# Patient Record
Sex: FEMALE | Race: OTHER | NOT HISPANIC OR LATINO | Employment: FULL TIME | URBAN - METROPOLITAN AREA
[De-identification: names, ages, dates, MRNs, and addresses within clinical notes are randomized per-mention and may not be internally consistent; named-entity substitution may affect disease eponyms.]

---

## 2018-11-30 ENCOUNTER — TRANSCRIBE ORDERS (OUTPATIENT)
Dept: ADMINISTRATIVE | Facility: HOSPITAL | Age: 48
End: 2018-11-30

## 2018-11-30 DIAGNOSIS — Z12.39 SCREENING BREAST EXAMINATION: Primary | ICD-10-CM

## 2018-12-12 ENCOUNTER — HOSPITAL ENCOUNTER (OUTPATIENT)
Dept: RADIOLOGY | Facility: HOSPITAL | Age: 48
Discharge: HOME | End: 2018-12-12
Payer: COMMERCIAL

## 2018-12-12 VITALS — BODY MASS INDEX: 23.3 KG/M2 | WEIGHT: 145 LBS | HEIGHT: 66 IN

## 2018-12-12 DIAGNOSIS — Z12.39 SCREENING BREAST EXAMINATION: ICD-10-CM

## 2018-12-12 PROCEDURE — 77063 BREAST TOMOSYNTHESIS BI: CPT

## 2018-12-12 PROCEDURE — 77067 SCR MAMMO BI INCL CAD: CPT

## 2020-01-17 ENCOUNTER — TRANSCRIBE ORDERS (OUTPATIENT)
Dept: ADMINISTRATIVE | Facility: HOSPITAL | Age: 50
End: 2020-01-17

## 2020-01-17 DIAGNOSIS — Z12.31 ENCOUNTER FOR SCREENING MAMMOGRAM FOR BREAST CANCER: Primary | ICD-10-CM

## 2020-05-05 ENCOUNTER — HOSPITAL ENCOUNTER (OUTPATIENT)
Dept: RADIOLOGY | Facility: HOSPITAL | Age: 50
Discharge: HOME | End: 2020-05-05

## 2020-06-15 ENCOUNTER — HOSPITAL ENCOUNTER (OUTPATIENT)
Dept: RADIOLOGY | Facility: HOSPITAL | Age: 50
Discharge: HOME | End: 2020-06-15
Payer: COMMERCIAL

## 2020-06-15 VITALS — BODY MASS INDEX: 23.3 KG/M2 | HEIGHT: 66 IN | WEIGHT: 145 LBS

## 2020-06-15 DIAGNOSIS — Z12.31 ENCOUNTER FOR SCREENING MAMMOGRAM FOR BREAST CANCER: ICD-10-CM

## 2020-06-15 PROCEDURE — 77063 BREAST TOMOSYNTHESIS BI: CPT

## 2020-06-15 PROCEDURE — 77067 SCR MAMMO BI INCL CAD: CPT

## 2021-02-26 ENCOUNTER — TELEPHONE (OUTPATIENT)
Dept: GASTROENTEROLOGY | Facility: CLINIC | Age: 51
End: 2021-02-26

## 2021-02-26 NOTE — TELEPHONE ENCOUNTER
Returned pts call  She called to schedule and appointment with Dr Severo Cocks  She last saw him in 2008  I lmom for her to call back and we would get her scheduled  If she calls back please schedule

## 2021-02-26 NOTE — TELEPHONE ENCOUNTER
Pt called back  She is going to call her insurance to see if we are PAR with her plan  I gave her the NPI and Tax ID #s  She will call back if we participate

## 2021-03-01 NOTE — TELEPHONE ENCOUNTER
Patient left message late Friday returning call  I returned her call and had to leave message for her to call back

## 2021-03-12 ENCOUNTER — OFFICE VISIT (OUTPATIENT)
Dept: GASTROENTEROLOGY | Facility: CLINIC | Age: 51
End: 2021-03-12
Payer: COMMERCIAL

## 2021-03-12 VITALS
WEIGHT: 163 LBS | BODY MASS INDEX: 27.16 KG/M2 | SYSTOLIC BLOOD PRESSURE: 137 MMHG | DIASTOLIC BLOOD PRESSURE: 78 MMHG | HEIGHT: 65 IN | HEART RATE: 77 BPM

## 2021-03-12 DIAGNOSIS — Z12.11 ENCOUNTER FOR COLORECTAL CANCER SCREENING: ICD-10-CM

## 2021-03-12 DIAGNOSIS — Z12.12 ENCOUNTER FOR COLORECTAL CANCER SCREENING: ICD-10-CM

## 2021-03-12 DIAGNOSIS — K64.9 HEMORRHOIDS, UNSPECIFIED HEMORRHOID TYPE: Primary | ICD-10-CM

## 2021-03-12 PROCEDURE — 99204 OFFICE O/P NEW MOD 45 MIN: CPT | Performed by: PHYSICIAN ASSISTANT

## 2021-03-12 RX ORDER — DICYCLOMINE HCL 20 MG
TABLET ORAL 4 TIMES DAILY
COMMUNITY
Start: 2009-11-07 | End: 2021-07-07

## 2021-03-12 RX ORDER — VALACYCLOVIR HYDROCHLORIDE 1 G/1
TABLET, FILM COATED ORAL
COMMUNITY
Start: 2021-02-22

## 2021-03-12 NOTE — PROGRESS NOTES
[facility] - Outpatient Consultation  Amparo Chun 45 y o  female MRN: [id number]  Encounter: [id number]          ASSESSMENT AND PLAN:      1  Hemorrhoids, unspecified hemorrhoid type   patient with history of hemorrhoids and underwent multiple bandings which were unsuccessful and apparently very painful after in office banding procedure  We will plan to do hemorrhoidal banding at the time of the colonoscopy  Recommend to start Metamucil daily  - Colonoscopy; Future  - PAT Covid Screening; Future    2  Encounter for colorectal cancer screening   plan for colonoscopy for colorectal screening as she has not had 1 in 15 years and is having rectal bleeding  - Colonoscopy; Future  - PAT Covid Screening; Future     The patient was seen and examined with Dr Radha Castorena    ______________________________________________________________________    HPI:    This is a 79-year-old female who presents for consultation to colonoscopy as well as hemorrhoids  Patient states that she had a colonoscopy by our practice 15 years ago and she has not had 1 since that time  Patient has been having hemorrhoidal banding multiple times by physician at [facility] but still states that the hemorrhoids were still present  Last time she had bleeding was about 2 weeks ago but this was not much  Hemorrhoids are not significantly painful but she feels something prolapse out and then she feels uncomfortable  She states that her bowel habits fluctuate  She sometimes has diarrhea and other times constipation  She states that she does not take Metamucil on a regular basis but she does have some at home  Denies any family history of colon cancer  REVIEW OF SYSTEMS:    CONSTITUTIONAL: Denies any fever, chills, rigors, and weight loss  HEENT: No earache or tinnitus  Denies hearing loss or visual disturbances  CARDIOVASCULAR: No chest pain or palpitations     RESPIRATORY: Denies any cough, hemoptysis, shortness of breath or dyspnea on exertion  GASTROINTESTINAL: As noted in the History of Present Illness  GENITOURINARY: No problems with urination  Denies any hematuria or dysuria  NEUROLOGIC: No dizziness or vertigo, denies headaches  MUSCULOSKELETAL: Denies any muscle or joint pain  SKIN: Denies skin rashes or itching  ENDOCRINE: Denies excessive thirst  Denies intolerance to heat or cold  PSYCHOSOCIAL: Denies depression or anxiety  Denies any recent memory loss  Historical Information   History reviewed  No pertinent past medical history  Past Surgical History:   Procedure Laterality Date    VULVA SURGERY       Social History   Social History     Substance and Sexual Activity   Alcohol Use Yes    Frequency: Monthly or less     Social History     Substance and Sexual Activity   Drug Use Never     Social History     Tobacco Use   Smoking Status Never Smoker   Smokeless Tobacco Never Used     Family History   Problem Relation Age of Onset    Hypertension Mother     Thyroid disease Father     No Known Problems Sister     Endometrial cancer Paternal Aunt        Meds/Allergies       Current Outpatient Medications:     valACYclovir (VALTREX) 1,000 mg tablet    dicyclomine (Bentyl) 20 mg tablet    No Known Allergies        Objective     Blood pressure 137/78, pulse 77, height 5' 5" (1 651 m), weight 73 9 kg (163 lb)  Body mass index is 27 12 kg/m²  PHYSICAL EXAM:      General Appearance:   Alert, cooperative, no distress   HEENT:   Normocephalic, atraumatic, anicteric      Neck:  Supple, symmetrical, trachea midline   Lungs:   Clear to auscultation bilaterally; no rales, rhonchi or wheezing; respirations unlabored    Heart[de-identified]   Regular rate and rhythm; no murmur, rub, or gallop     Abdomen:   Soft, non-tender, non-distended; normal bowel sounds; no masses, no organomegaly    Genitalia:   Deferred    Rectal:   Deferred    Extremities:  No cyanosis, clubbing or edema    Pulses:  2+ and symmetric    Skin:  No jaundice, rashes, or lesions    Lymph nodes:  No palpable cervical lymphadenopathy        Lab Results:   No visits with results within 1 Day(s) from this visit  Latest known visit with results is:   No results found for any previous visit  Radiology Results:   No results found

## 2021-07-07 ENCOUNTER — TELEPHONE (OUTPATIENT)
Dept: PREADMISSION TESTING | Facility: HOSPITAL | Age: 51
End: 2021-07-07

## 2021-07-07 RX ORDER — DIPHENOXYLATE HYDROCHLORIDE AND ATROPINE SULFATE 2.5; .025 MG/1; MG/1
1 TABLET ORAL DAILY
COMMUNITY

## 2021-07-07 RX ORDER — FERROUS SULFATE 325(65) MG
TABLET ORAL
COMMUNITY

## 2021-07-07 RX ORDER — AMOXICILLIN AND CLAVULANATE POTASSIUM 875; 125 MG/1; MG/1
TABLET, FILM COATED ORAL
COMMUNITY
Start: 2021-07-02

## 2021-07-07 NOTE — PRE-PROCEDURE INSTRUCTIONS
Pre-Surgery Instructions:   Medication Instructions    amoxicillin-clavulanate (AUGMENTIN) 875-125 mg per tablet Patient was instructed by Physician and understands   cyanocobalamin (VITAMIN B-12) 100 MCG tablet Patient was instructed by Physician and understands   ferrous sulfate 325 (65 Fe) mg tablet Patient was instructed by Physician and understands   magnesium oxide (MAG-OX) 400 mg Patient was instructed by Physician and understands   multivitamin [facility]) TABS Patient was instructed by Physician and understands   valACYclovir (VALTREX) 1,000 mg tablet Patient was instructed by Physician and understands

## 2021-07-14 ENCOUNTER — ANESTHESIA EVENT (OUTPATIENT)
Dept: GASTROENTEROLOGY | Facility: AMBULARY SURGERY CENTER | Age: 51
End: 2021-07-14

## 2021-07-14 ENCOUNTER — HOSPITAL ENCOUNTER (OUTPATIENT)
Dept: GASTROENTEROLOGY | Facility: AMBULARY SURGERY CENTER | Age: 51
Setting detail: OUTPATIENT SURGERY
Discharge: HOME | End: 2021-07-14
Admitting: PHYSICIAN ASSISTANT
Payer: COMMERCIAL

## 2021-07-14 ENCOUNTER — ANESTHESIA (OUTPATIENT)
Dept: GASTROENTEROLOGY | Facility: AMBULARY SURGERY CENTER | Age: 51
End: 2021-07-14

## 2021-07-14 VITALS
RESPIRATION RATE: 16 BRPM | OXYGEN SATURATION: 98 % | DIASTOLIC BLOOD PRESSURE: 60 MMHG | SYSTOLIC BLOOD PRESSURE: 127 MMHG | BODY MASS INDEX: 27.16 KG/M2 | HEIGHT: 65 IN | TEMPERATURE: 97.9 F | WEIGHT: 163 LBS | HEART RATE: 68 BPM

## 2021-07-14 DIAGNOSIS — Z12.11 ENCOUNTER FOR COLORECTAL CANCER SCREENING: ICD-10-CM

## 2021-07-14 DIAGNOSIS — K64.9 HEMORRHOIDS, UNSPECIFIED HEMORRHOID TYPE: ICD-10-CM

## 2021-07-14 DIAGNOSIS — Z12.12 ENCOUNTER FOR COLORECTAL CANCER SCREENING: ICD-10-CM

## 2021-07-14 LAB
EXT PREGNANCY TEST URINE: NEGATIVE
EXT. CONTROL: NORMAL

## 2021-07-14 PROCEDURE — G0121 COLON CA SCRN NOT HI RSK IND: HCPCS | Performed by: INTERNAL MEDICINE

## 2021-07-14 PROCEDURE — 81025 URINE PREGNANCY TEST: CPT | Performed by: ANESTHESIOLOGY

## 2021-07-14 RX ORDER — ONDANSETRON 2 MG/ML
4 INJECTION INTRAMUSCULAR; INTRAVENOUS ONCE AS NEEDED
Status: CANCELLED | OUTPATIENT
Start: 2021-07-14

## 2021-07-14 RX ORDER — SODIUM CHLORIDE, SODIUM LACTATE, POTASSIUM CHLORIDE, CALCIUM CHLORIDE 600; 310; 30; 20 MG/100ML; MG/100ML; MG/100ML; MG/100ML
INJECTION, SOLUTION INTRAVENOUS CONTINUOUS PRN
Status: DISCONTINUED | OUTPATIENT
Start: 2021-07-14 | End: 2021-07-14

## 2021-07-14 RX ORDER — LIDOCAINE HYDROCHLORIDE 10 MG/ML
INJECTION, SOLUTION EPIDURAL; INFILTRATION; INTRACAUDAL; PERINEURAL AS NEEDED
Status: DISCONTINUED | OUTPATIENT
Start: 2021-07-14 | End: 2021-07-14

## 2021-07-14 RX ORDER — SODIUM CHLORIDE, SODIUM LACTATE, POTASSIUM CHLORIDE, CALCIUM CHLORIDE 600; 310; 30; 20 MG/100ML; MG/100ML; MG/100ML; MG/100ML
125 INJECTION, SOLUTION INTRAVENOUS CONTINUOUS
Status: DISCONTINUED | OUTPATIENT
Start: 2021-07-14 | End: 2021-07-18 | Stop reason: HOSPADM

## 2021-07-14 RX ORDER — PROPOFOL 10 MG/ML
INJECTION, EMULSION INTRAVENOUS AS NEEDED
Status: DISCONTINUED | OUTPATIENT
Start: 2021-07-14 | End: 2021-07-14

## 2021-07-14 RX ADMIN — LIDOCAINE HYDROCHLORIDE 50 MG: 10 INJECTION, SOLUTION EPIDURAL; INFILTRATION; INTRACAUDAL; PERINEURAL at 08:04

## 2021-07-14 RX ADMIN — PROPOFOL 40 MG: 10 INJECTION, EMULSION INTRAVENOUS at 08:10

## 2021-07-14 RX ADMIN — PROPOFOL 120 MG: 10 INJECTION, EMULSION INTRAVENOUS at 08:04

## 2021-07-14 RX ADMIN — PROPOFOL 40 MG: 10 INJECTION, EMULSION INTRAVENOUS at 08:08

## 2021-07-14 RX ADMIN — PROPOFOL 40 MG: 10 INJECTION, EMULSION INTRAVENOUS at 08:12

## 2021-07-14 RX ADMIN — SODIUM CHLORIDE, SODIUM LACTATE, POTASSIUM CHLORIDE, AND CALCIUM CHLORIDE: .6; .31; .03; .02 INJECTION, SOLUTION INTRAVENOUS at 07:59

## 2021-07-14 RX ADMIN — SODIUM CHLORIDE, SODIUM LACTATE, POTASSIUM CHLORIDE, AND CALCIUM CHLORIDE 125 ML/HR: .6; .31; .03; .02 INJECTION, SOLUTION INTRAVENOUS at 07:58

## 2021-07-14 RX ADMIN — PROPOFOL 40 MG: 10 INJECTION, EMULSION INTRAVENOUS at 08:06

## 2021-07-14 NOTE — PERIOPERATIVE NURSING NOTE
Post-Op processes and procedures were explained and all related patient and family questions were answered  Discharge instructions were given and all related patient and family questions were answered  Pt d/c to home at this time ambulatory accompanied by spouse  Pt left with all belongings  Iv was D/C intact with dry sterile dressing  Encouraged to keep follow up appointments, Verbalized understanding  D/C instructions reviewed and explained  Verbalized understanding

## 2021-07-14 NOTE — H&P
History and Physical - [facility]  Rusty Anderson 45 y o  female MRN: [id number]                  HPI: Rusty Anderson is a 45y o  year old female who presents for change in bowel habit, for screening colonoscopy, last colonoscopy was 15 years ago      REVIEW OF SYSTEMS: Per the HPI, and otherwise unremarkable  Historical Information   Past Medical History:   Diagnosis Date    Anemia      Past Surgical History:   Procedure Laterality Date    VULVA SURGERY       Social History   Social History     Substance and Sexual Activity   Alcohol Use Yes    Comment: socially     Social History     Substance and Sexual Activity   Drug Use Never     Social History     Tobacco Use   Smoking Status Never Smoker   Smokeless Tobacco Never Used     Family History   Problem Relation Age of Onset    Hypertension Mother     Thyroid disease Father     No Known Problems Sister     Endometrial cancer Paternal Aunt        Meds/Allergies     (Not in a hospital admission)      No Known Allergies    Objective     /63   Pulse 71   Temp 97 9 °F (36 6 °C) (Tympanic)   Resp 18   Ht 5' 5" (1 651 m)   Wt 73 9 kg (163 lb)   LMP 07/05/2021 (Exact Date)   SpO2 99%   BMI 27 12 kg/m²       PHYSICAL EXAM    Gen: NAD  CV: RRR  CHEST: Clear  ABD: soft, NT/ND  EXT: no edema      ASSESSMENT/PLAN:  This is a 45y o  year old female here for screening colonoscopy, and she is stable and optimized for her procedure

## 2021-07-14 NOTE — ANESTHESIA POSTPROCEDURE EVALUATION
Post-Op Assessment Note    CV Status:  Stable  Pain Score: 0    Pain management: adequate     Mental Status:  Sleepy   Hydration Status:  Euvolemic   PONV Controlled:  Controlled   Airway Patency:  Patent      Post Op Vitals Reviewed: Yes      Staff: CRNA         No complications documented      BP   104/58   Temp      Pulse 72   Resp   14   SpO2   98

## 2021-07-14 NOTE — ANESTHESIA PREPROCEDURE EVALUATION
Procedure:  COLONOSCOPY    Relevant Problems   ANESTHESIA (within normal limits)      CARDIO (within normal limits)      ENDO (within normal limits)      PULMONARY (within normal limits)        Physical Exam    Airway    Mallampati score: II  TM Distance: >3 FB  Neck ROM: full     Dental   No notable dental hx     Cardiovascular  Rhythm: regular, Rate: normal,     Pulmonary  Breath sounds clear to auscultation,     Other Findings        Anesthesia Plan  ASA Score- 1     Anesthesia Type- IV sedation with anesthesia with ASA Monitors  Additional Monitors:   Airway Plan:           Plan Factors-Exercise tolerance (METS): >4 METS  Chart reviewed  Existing labs reviewed  Patient summary reviewed  Patient is not a current smoker  Induction-     Postoperative Plan-     Informed Consent- Anesthetic plan and risks discussed with patient  I personally reviewed this patient with the CRNA  Discussed and agreed on the Anesthesia Plan with the CRNA  Alvaro Souza

## 2021-10-20 ENCOUNTER — TELEPHONE (OUTPATIENT)
Dept: GASTROENTEROLOGY | Facility: CLINIC | Age: 51
End: 2021-10-20

## 2022-02-10 ENCOUNTER — OFFICE VISIT (OUTPATIENT)
Dept: GASTROENTEROLOGY | Facility: CLINIC | Age: 52
End: 2022-02-10
Payer: COMMERCIAL

## 2022-02-10 VITALS
DIASTOLIC BLOOD PRESSURE: 93 MMHG | SYSTOLIC BLOOD PRESSURE: 127 MMHG | WEIGHT: 164 LBS | HEIGHT: 66 IN | HEART RATE: 79 BPM | BODY MASS INDEX: 26.36 KG/M2

## 2022-02-10 DIAGNOSIS — K64.2 GRADE III HEMORRHOIDS: Primary | ICD-10-CM

## 2022-02-10 DIAGNOSIS — K59.04 CHRONIC IDIOPATHIC CONSTIPATION: ICD-10-CM

## 2022-02-10 PROCEDURE — 99214 OFFICE O/P EST MOD 30 MIN: CPT | Performed by: INTERNAL MEDICINE

## 2022-02-10 RX ORDER — DIAPER,BRIEF,INFANT-TODD,DISP
EACH MISCELLANEOUS 4 TIMES DAILY PRN
Qty: 30 G | Refills: 0 | Status: SHIPPED | OUTPATIENT
Start: 2022-02-10

## 2022-02-10 RX ORDER — DOCUSATE SODIUM 100 MG/1
100 CAPSULE, LIQUID FILLED ORAL 2 TIMES DAILY
Qty: 60 CAPSULE | Refills: 5 | Status: SHIPPED | OUTPATIENT
Start: 2022-02-10

## 2022-02-10 NOTE — PATIENT INSTRUCTIONS
High Fiber Diet   AMBULATORY CARE:   A high-fiber diet  includes foods that have a high amount of fiber  Fiber is the part of fruits, vegetables, and grains that is not broken down by your body  Fiber keeps your bowel movements regular  Fiber can also help lower your cholesterol level, control blood sugar in people with diabetes, and relieve constipation  Fiber can also help you control your weight because it helps you feel full faster  Most adults should eat 25 to 35 grams of fiber each day  Talk to your dietitian or healthcare provider about the amount of fiber you need  Good sources of fiber:       · Foods with at least 4 grams of fiber per serving:      ? ? to ½ cup of high-fiber cereal (check the nutrition label on the box)    ? ½ cup of blackberries or raspberries    ? 4 dried prunes    ? 1 cooked artichoke    ? ½ cup of cooked legumes, such as lentils, or red, kidney, and seals beans    · Foods with 1 to 3 grams of fiber per serving:      ? 1 slice of whole-wheat, pumpernickel, or rye bread    ? ½ cup of cooked brown rice    ? 4 whole-wheat crackers    ? 1 cup of oatmeal    ? ½ cup of cereal with 1 to 3 grams of fiber per serving (check the nutrition label on the box)    ? 1 small piece of fruit, such as an apple, banana, pear, kiwi, or orange    ? 3 dates    ? ½ cup of canned apricots, fruit cocktail, peaches, or pears    ? ½ cup of raw or cooked vegetables, such as carrots, cauliflower, cabbage, spinach, squash, or corn    Ways that you can increase fiber in your diet:   · Choose brown or wild rice instead of white rice  · Use whole wheat flour in recipes instead of white or all-purpose flour  · Add beans and peas to casseroles or soups  · Choose fresh fruit and vegetables with peels or skins on instead of juices  Other diet guidelines to follow:   · Add fiber to your diet slowly  You may have abdominal discomfort, bloating, and gas if you add fiber to your diet too quickly       · Drink plenty of liquids as you add fiber to your diet  You may have nausea or develop constipation if you do not drink enough water  Ask how much liquid to drink each day and which liquids are best for you  © Copyright [employer] 2021 Information is for End User's use only and may not be sold, redistributed or otherwise used for commercial purposes  All illustrations and images included in CareNotes® are the copyrighted property of [employer]  or [facility] Vanessa Guthrie   The above information is an  only  It is not intended as medical advice for individual conditions or treatments  Talk to your doctor, nurse or pharmacist before following any medical regimen to see if it is safe and effective for you

## 2022-02-10 NOTE — PROGRESS NOTES
[facility] - Outpatient Follow-up Note  Corby Liu 45 y o  female MRN: [id number]  Encounter: [id number]          ASSESSMENT AND PLAN:      1  Grade III hemorrhoids  Patient has history of grade 3 hemorrhoid  She has occasionally felt hemorrhoidal prolapse  She has seen that with mirror also  She occasionally gets rectal bleeding  She does have constipation bloating  She has poor fiber intake  Long discussion regarding high-fiber diet or taking Metamucil  Will also give her Colace to take twice a day  I have advised patient to take Mardell Spoon however patient would not want to take it for now  Tried to do hemorrhoidal banding without success  Patient has large amount of rectal stool which prevented deployment of the band  Anoscopy was done and Most of her hemorrhoid is placed posteriorly  Evidence of distal vaginal surgery noted  Banding could not be done because of large amount of retained stool in the rectal vault  - hydrocortisone 1 % cream; Apply topically 4 (four) times a day as needed (Apply b i d )  Dispense: 30 g; Refill: 0    2  Chronic idiopathic constipation  See above discussion  High-fiber diet recommended  - docusate sodium (COLACE) 100 mg capsule; Take 1 capsule (100 mg total) by mouth 2 (two) times a day  Dispense: 60 capsule; Refill: 5    ______________________________________________________________________    SUBJECTIVE:  Constipation and rectal bleeding infrequently  Patient has history of vaginal lesion which was operated on with skin resection in the distal posterior vaginal area  REVIEW OF SYSTEMS IS OTHERWISE NEGATIVE        Historical Information   Past Medical History:   Diagnosis Date    Anemia      Past Surgical History:   Procedure Laterality Date    VULVA SURGERY       Social History   Social History     Substance and Sexual Activity   Alcohol Use Yes    Comment: socially     Social History     Substance and Sexual Activity   Drug Use Never     Social History     Tobacco Use   Smoking Status Never Smoker   Smokeless Tobacco Never Used     Family History   Problem Relation Age of Onset    Hypertension Mother     Thyroid disease Father     No Known Problems Sister     Endometrial cancer Paternal Aunt        Meds/Allergies       Current Outpatient Medications:     cyanocobalamin (VITAMIN B-12) 100 MCG tablet    multivitamin (THERAGRAN) TABS    amoxicillin-clavulanate (AUGMENTIN) 875-125 mg per tablet    docusate sodium (COLACE) 100 mg capsule    ferrous sulfate 325 (65 Fe) mg tablet    hydrocortisone 1 % cream    magnesium oxide (MAG-OX) 400 mg    valACYclovir (VALTREX) 1,000 mg tablet    No Known Allergies        Objective     Blood pressure 127/93, pulse 79, height 5' 6" (1 676 m), weight 74 4 kg (164 lb)  Body mass index is 26 47 kg/m²  PHYSICAL EXAM:      General Appearance:   Alert, cooperative, no distress   HEENT:   Normocephalic, atraumatic, anicteric      Neck:  Supple, symmetrical, trachea midline   Lungs:   Clear to auscultation bilaterally; no rales, rhonchi or wheezing; respirations unlabored    Heart[de-identified]   Regular rate and rhythm; no murmur, rub, or gallop  Abdomen:   Soft, non-tender, non-distended; normal bowel sounds; no masses, no organomegaly    Genitalia:   Deferred    Rectal:   Deferred    Extremities:  No cyanosis, clubbing or edema    Pulses:  2+ and symmetric    Skin:  No jaundice, rashes, or lesions    Lymph nodes:  No palpable cervical lymphadenopathy        Lab Results:   No visits with results within 1 Day(s) from this visit  Latest known visit with results is:   Hospital Outpatient Visit on 07/14/2021   Component Date Value    EXT Preg Test, Ur 07/14/2021 Negative     Control 07/14/2021 Valid          Radiology Results:   No results found

## 2022-02-28 ENCOUNTER — APPOINTMENT (EMERGENCY)
Dept: RADIOLOGY | Facility: HOSPITAL | Age: 52
End: 2022-02-28
Payer: OTHER MISCELLANEOUS

## 2022-02-28 ENCOUNTER — HOSPITAL ENCOUNTER (EMERGENCY)
Facility: HOSPITAL | Age: 52
Discharge: HOME | End: 2022-02-28
Attending: EMERGENCY MEDICINE
Payer: OTHER MISCELLANEOUS

## 2022-02-28 VITALS
HEART RATE: 98 BPM | TEMPERATURE: 97.4 F | SYSTOLIC BLOOD PRESSURE: 161 MMHG | DIASTOLIC BLOOD PRESSURE: 72 MMHG | OXYGEN SATURATION: 99 % | RESPIRATION RATE: 18 BRPM

## 2022-02-28 DIAGNOSIS — K08.89 PAIN, DENTAL: Primary | ICD-10-CM

## 2022-02-28 PROCEDURE — 99284 EMERGENCY DEPT VISIT MOD MDM: CPT | Performed by: PHYSICIAN ASSISTANT

## 2022-02-28 PROCEDURE — G1004 CDSM NDSC: HCPCS

## 2022-02-28 PROCEDURE — 70486 CT MAXILLOFACIAL W/O DYE: CPT

## 2022-02-28 PROCEDURE — 99283 EMERGENCY DEPT VISIT LOW MDM: CPT

## 2022-02-28 RX ORDER — AMOXICILLIN AND CLAVULANATE POTASSIUM 875; 125 MG/1; MG/1
1 TABLET, FILM COATED ORAL EVERY 12 HOURS SCHEDULED
Qty: 14 TABLET | Refills: 0 | Status: SHIPPED | OUTPATIENT
Start: 2022-02-28 | End: 2022-03-07

## 2022-02-28 RX ORDER — IBUPROFEN 600 MG/1
600 TABLET ORAL EVERY 6 HOURS PRN
Qty: 30 TABLET | Refills: 0 | Status: SHIPPED | OUTPATIENT
Start: 2022-02-28

## 2022-02-28 NOTE — ED PROVIDER NOTES
History  Chief Complaint   Patient presents with    Dental Pain     Dental pain to upper right side x1 week from dental implant  States she took hydrocodone at 0330 this morning for pain  Went to Dr Jeanmarie Deluna and was given antibiotic  Today started with nausea and vomitting      Patient is a 21-year-old female with no significant PMH presenting to the ER with dental pain x 4 days  She had a dental implant procedure x 1 month ago for a broken tooth that occurred x 6 months ago  The pain is located primarily on the upper R maxillary region of the mouth and radiates down her neck  She has been taking her 's hydrocodone-acetaminophen (Percocet) and ibuprofen q 4 hrs for the past 3 days  This has provided some relief, but she is having symptoms of nausea, 1 episode of vomiting, dizziness, rhinorrhea, and chills  She saw her PCP on Saturday and was given Augmentin which has been taking  She has not yet contacted her dentist regarding the pain  History provided by:  Patient   used: No    Dental Pain  Location:  Upper  Quality:  Constant  Onset quality:  Sudden  Progression:  Improving  Context: recent dental surgery    Context: not abscess    Previous work-up:  Dental exam (Implant)  Relieved by:  Acetaminophen (husbands percocet)  Worsened by:  Cold food/drink, hot food/drink and pressure  Associated symptoms: neck pain and neck swelling    Associated symptoms: no congestion, no difficulty swallowing, no drooling, no facial pain, no facial swelling, no fever, no gum swelling, no headaches and no oral lesions    Risk factors: no alcohol problem, no cancer, sufficient dental care, no periodontal disease and no smoking        Prior to Admission Medications   Prescriptions Last Dose Informant Patient Reported?  Taking?   amoxicillin-clavulanate (AUGMENTIN) 875-125 mg per tablet  Self Yes No   Patient not taking: Reported on 2/10/2022    cyanocobalamin (VITAMIN B-12) 100 MCG tablet  Self Yes No Sig: B-12 100 MCG Oral Tablet  Refills: 0   docusate sodium (COLACE) 100 mg capsule   No No   Sig: Take 1 capsule (100 mg total) by mouth 2 (two) times a day   ferrous sulfate 325 (65 Fe) mg tablet  Self Yes No   Sig: [facility] Iron 325 (65 Fe) MG Oral Tablet  Refills: 0   Patient not taking: Reported on 2/10/2022   hydrocortisone 1 % cream   No No   Sig: Apply topically 4 (four) times a day as needed (Apply b i d )   magnesium oxide (MAG-OX) 400 mg  Self Yes No   Sig: Take 400 mg by mouth 2 (two) times a day   Patient not taking: Reported on 2/10/2022    multivitamin (THERAGRAN) TABS  Self Yes No   Sig: Take 1 tablet by mouth daily   valACYclovir (VALTREX) 1,000 mg tablet  Self Yes No   Patient not taking: Reported on 2/10/2022       Facility-Administered Medications: None       Past Medical History:   Diagnosis Date    Anemia        Past Surgical History:   Procedure Laterality Date    VULVA SURGERY         Family History   Problem Relation Age of Onset    Hypertension Mother     Thyroid disease Father     No Known Problems Sister     Endometrial cancer Paternal Aunt      I have reviewed and agree with the history as documented  E-Cigarette/Vaping     E-Cigarette/Vaping Substances    Nicotine No     THC No     CBD No     Flavoring No     Other No     Unknown No      Social History     Tobacco Use    Smoking status: Never Smoker    Smokeless tobacco: Never Used   Vaping Use    Vaping Use: Not on file   Substance Use Topics    Alcohol use: Yes     Comment: socially    Drug use: Never       Review of Systems   Constitutional: Positive for chills  Negative for diaphoresis, fatigue and fever  HENT: Positive for dental problem  Negative for congestion, drooling, ear discharge, ear pain, facial swelling, hearing loss, mouth sores, nosebleeds, postnasal drip, rhinorrhea, sinus pressure, sinus pain, sneezing, sore throat, tinnitus, trouble swallowing and voice change      Eyes: Negative for pain, discharge and itching  Respiratory: Negative for cough, chest tightness and shortness of breath  Cardiovascular: Negative for chest pain and leg swelling  Gastrointestinal: Positive for nausea and vomiting  Negative for diarrhea  Genitourinary: Negative for difficulty urinating  Musculoskeletal: Positive for neck pain  Negative for gait problem and myalgias  Skin: Negative for pallor and rash  Neurological: Positive for dizziness  Negative for syncope and headaches  Physical Exam  Physical Exam  Constitutional:       General: She is not in acute distress  Appearance: Normal appearance  She is not toxic-appearing  HENT:      Head: Normocephalic and atraumatic  Right Ear: Ear canal and external ear normal       Left Ear: Tympanic membrane, ear canal and external ear normal       Ears:      Comments: Erythema present on R tympanic membrane  Nose: Nose normal       Mouth/Throat:      Mouth: Mucous membranes are moist       Pharynx: No oropharyngeal exudate  Comments: No swelling around area of dental implant  No erythema or drainage noted  Eyes:      General:         Right eye: No discharge  Left eye: No discharge  Conjunctiva/sclera: Conjunctivae normal       Pupils: Pupils are equal, round, and reactive to light  Cardiovascular:      Rate and Rhythm: Normal rate and regular rhythm  Pulses: Normal pulses  Heart sounds: Normal heart sounds  Pulmonary:      Effort: Pulmonary effort is normal       Breath sounds: Normal breath sounds  Musculoskeletal:         General: Normal range of motion  Cervical back: Normal range of motion  Lymphadenopathy:      Cervical: Cervical adenopathy present  Skin:     General: Skin is warm and dry  Capillary Refill: Capillary refill takes less than 2 seconds  Neurological:      General: No focal deficit present  Mental Status: She is alert  Mental status is at baseline        Cranial Nerves: No cranial nerve deficit  Vital Signs  ED Triage Vitals [02/28/22 0750]   Temperature Pulse Respirations Blood Pressure SpO2   (!) 97 4 °F (36 3 °C) 98 18 161/72 99 %      Temp Source Heart Rate Source Patient Position - Orthostatic VS BP Location FiO2 (%)   Temporal Monitor Sitting Left arm --      Pain Score       2           Vitals:    02/28/22 0750   BP: 161/72   Pulse: 98   Patient Position - Orthostatic VS: Sitting         Visual Acuity      ED Medications  Medications - No data to display    Diagnostic Studies  Results Reviewed     None                 CT facial bones without contrast   Final Result by Velvet Saab MD (02/28 1014)         1  Dental changes as noted  Multiple restorations present  Tooth 3 demonstrates small periapical cyst invaginating into the right maxillary sinus, possibly radicular cyst   Moderate right maxillary sinus mucoperiosteal thickening is present  Tooth 5    is absent and there is what may represent an implant or portion of an implant in the maxilla  2   Additional changes as noted  Workstation performed: [id number]                    Procedures  Procedures         ED Course                               SBIRT 20yo+      Most Recent Value   SBIRT (23 yo +)    In order to provide better care to our patients, we are screening all of our patients for alcohol and drug use  Would it be okay to ask you these screening questions?  No Filed at: 02/28/2022 0847                    MDM  Number of Diagnoses or Management Options  Pain, dental: new and requires workup  Diagnosis management comments: CT with evidence of intrusion of dental implant into sinus cavity  Reviewed results of CT scan with patient and pts dentist Dr Alexis Vazquez  Pt will be going to dentist office after discharge from ED for evaluation/intervention  Continue Augmentin  PRN ibuprofen         Amount and/or Complexity of Data Reviewed  Clinical lab tests: ordered and reviewed  Tests in the radiology section of CPT®: ordered and reviewed  Decide to obtain previous medical records or to obtain history from someone other than the patient: yes  Obtain history from someone other than the patient: yes  Review and summarize past medical records: yes  Discuss the patient with other providers: yes    Risk of Complications, Morbidity, and/or Mortality  Presenting problems: moderate  Diagnostic procedures: moderate  Management options: moderate    Patient Progress  Patient progress: stable      Disposition  Final diagnoses:   Pain, dental     Time reflects when diagnosis was documented in both MDM as applicable and the Disposition within this note     Time User Action Codes Description Comment    2/28/2022 10:30 AM Talmage Holter Add [K08 89] Pain, dental       ED Disposition     ED Disposition Condition Date/Time Comment    Discharge Stable Mon Feb 28, 2022 10:30 AM Marcia Recinos discharge to home/self care  Follow-up Information     Follow up With Specialties Details Why Contact Aditya Salas MD    [address] [place] [address] to   Go directly to office          Discharge Medication List as of 2/28/2022 10:39 AM      START taking these medications    Details   !! amoxicillin-clavulanate (AUGMENTIN) 875-125 mg per tablet Take 1 tablet by mouth every 12 (twelve) hours for 7 days, Starting Mon 2/28/2022, Until Mon 3/7/2022, Normal      ibuprofen (MOTRIN) 600 mg tablet Take 1 tablet (600 mg total) by mouth every 6 (six) hours as needed for moderate pain, Starting Mon 2/28/2022, Normal       !! - Potential duplicate medications found  Please discuss with provider        CONTINUE these medications which have NOT CHANGED    Details   !! amoxicillin-clavulanate (AUGMENTIN) 875-125 mg per tablet Starting Fri 7/2/2021, Historical Med      cyanocobalamin (VITAMIN B-12) 100 MCG tablet B-12 100 MCG Oral Tablet  Refills: 0, Historical Med      docusate sodium (COLACE) 100 mg capsule Take 1 capsule (100 mg total) by mouth 2 (two) times a day, Starting u 2/10/2022, Normal      ferrous sulfate 325 (65 Fe) mg tablet CVS Iron 325 (65 Fe) MG Oral Tablet  Refills: 0, Historical Med      hydrocortisone 1 % cream Apply topically 4 (four) times a day as needed (Apply b i d ), Starting Thu 2/10/2022, Normal      magnesium oxide (MAG-OX) 400 mg Take 400 mg by mouth 2 (two) times a day, Historical Med      multivitamin (THERAGRAN) TABS Take 1 tablet by mouth daily, Historical Med      valACYclovir (VALTREX) 1,000 mg tablet Starting Mon 2/22/2021, Historical Med       !! - Potential duplicate medications found  Please discuss with provider  No discharge procedures on file      PDMP Review     None          ED Provider  Electronically Signed by           Shilo Krause PA-C  02/28/22 1948

## 2022-03-01 ENCOUNTER — HOSPITAL ENCOUNTER (OUTPATIENT)
Dept: RADIOLOGY | Facility: HOSPITAL | Age: 52
Discharge: HOME | End: 2022-03-01
Payer: COMMERCIAL

## 2022-03-01 VITALS — WEIGHT: 145 LBS | HEIGHT: 66 IN | BODY MASS INDEX: 23.3 KG/M2

## 2022-03-01 DIAGNOSIS — Z12.31 ENCOUNTER FOR SCREENING MAMMOGRAM FOR BREAST CANCER: ICD-10-CM

## 2022-03-01 PROCEDURE — 77063 BREAST TOMOSYNTHESIS BI: CPT

## 2022-03-01 PROCEDURE — 77067 SCR MAMMO BI INCL CAD: CPT

## 2022-05-18 ENCOUNTER — HOSPITAL ENCOUNTER (OUTPATIENT)
Dept: RADIOLOGY | Facility: HOSPITAL | Age: 52
Discharge: HOME | End: 2022-05-18
Payer: COMMERCIAL

## 2022-05-18 DIAGNOSIS — R92.2 INCONCLUSIVE MAMMOGRAM: ICD-10-CM

## 2022-05-18 PROCEDURE — 76641 ULTRASOUND BREAST COMPLETE: CPT

## 2023-08-01 ENCOUNTER — HOSPITAL ENCOUNTER (OUTPATIENT)
Dept: RADIOLOGY | Facility: HOSPITAL | Age: 53
Discharge: HOME | End: 2023-08-01
Payer: COMMERCIAL

## 2023-08-01 VITALS — WEIGHT: 160 LBS | HEIGHT: 66 IN | BODY MASS INDEX: 25.71 KG/M2

## 2023-08-01 DIAGNOSIS — Z12.31 ENCOUNTER FOR SCREENING MAMMOGRAM FOR MALIGNANT NEOPLASM OF BREAST: ICD-10-CM

## 2023-08-01 PROCEDURE — 77063 BREAST TOMOSYNTHESIS BI: CPT

## 2023-08-01 PROCEDURE — 77067 SCR MAMMO BI INCL CAD: CPT

## 2025-04-28 ENCOUNTER — HOSPITAL ENCOUNTER (OUTPATIENT)
Dept: RADIOLOGY | Facility: HOSPITAL | Age: 55
Discharge: HOME | End: 2025-04-28
Payer: COMMERCIAL

## 2025-04-28 VITALS — BODY MASS INDEX: 25.71 KG/M2 | WEIGHT: 160 LBS | HEIGHT: 66 IN

## 2025-04-28 DIAGNOSIS — Z12.31 ENCOUNTER FOR SCREENING MAMMOGRAM FOR MALIGNANT NEOPLASM OF BREAST: ICD-10-CM

## 2025-04-28 PROCEDURE — 77067 SCR MAMMO BI INCL CAD: CPT

## 2025-04-28 PROCEDURE — 77063 BREAST TOMOSYNTHESIS BI: CPT

## 2025-07-16 ENCOUNTER — APPOINTMENT (OUTPATIENT)
Dept: RADIOLOGY | Facility: CLINIC | Age: 55
End: 2025-07-16
Attending: ORTHOPAEDIC SURGERY
Payer: COMMERCIAL

## 2025-07-16 ENCOUNTER — OFFICE VISIT (OUTPATIENT)
Dept: OBGYN CLINIC | Facility: CLINIC | Age: 55
End: 2025-07-16
Payer: COMMERCIAL

## 2025-07-16 VITALS — WEIGHT: 160 LBS | HEIGHT: 66 IN | BODY MASS INDEX: 25.71 KG/M2

## 2025-07-16 DIAGNOSIS — M25.522 LEFT ELBOW PAIN: ICD-10-CM

## 2025-07-16 DIAGNOSIS — M77.12 LATERAL EPICONDYLITIS OF LEFT ELBOW: Primary | ICD-10-CM

## 2025-07-16 DIAGNOSIS — M17.12 PRIMARY OSTEOARTHRITIS OF LEFT KNEE: ICD-10-CM

## 2025-07-16 DIAGNOSIS — M25.562 LEFT KNEE PAIN, UNSPECIFIED CHRONICITY: ICD-10-CM

## 2025-07-16 DIAGNOSIS — Z01.89 ENCOUNTER FOR LOWER EXTREMITY COMPARISON IMAGING STUDY: ICD-10-CM

## 2025-07-16 DIAGNOSIS — G89.29 CHRONIC PAIN OF LEFT KNEE: ICD-10-CM

## 2025-07-16 DIAGNOSIS — M25.562 CHRONIC PAIN OF LEFT KNEE: ICD-10-CM

## 2025-07-16 DIAGNOSIS — M70.52 PES ANSERINUS BURSITIS OF LEFT KNEE: ICD-10-CM

## 2025-07-16 PROCEDURE — 73080 X-RAY EXAM OF ELBOW: CPT

## 2025-07-16 PROCEDURE — 99203 OFFICE O/P NEW LOW 30 MIN: CPT | Performed by: ORTHOPAEDIC SURGERY

## 2025-07-16 PROCEDURE — 73562 X-RAY EXAM OF KNEE 3: CPT

## 2025-07-16 PROCEDURE — 73564 X-RAY EXAM KNEE 4 OR MORE: CPT

## 2025-07-16 RX ORDER — ROSUVASTATIN CALCIUM 10 MG/1
10 TABLET, COATED ORAL DAILY
COMMUNITY

## 2025-07-16 NOTE — ASSESSMENT & PLAN NOTE
Orders:  •  XR elbow 3+ vw left; Future  •  Cock Up Wrist Splint  •  Ambulatory Referral to Physical Therapy; Future

## 2025-07-16 NOTE — PATIENT INSTRUCTIONS
Counterforce left elbow brace to wear while active   Wear the wrist brace at night  Voltaren 1% gel 3-4x per day on left elbow and knee as needed for pain

## 2025-07-16 NOTE — PROGRESS NOTES
Name: Darcy Armstrong      : 1970      MRN: [id number]  Encounter Provider: Franc Messina MD  Encounter Date: 2025   Encounter department: [facility]  :  Assessment & Plan  Lateral epicondylitis of left elbow    Orders:  •  Cock Up Wrist Splint  •  Ambulatory Referral to Physical Therapy; Future    Pes anserinus bursitis of left knee    Orders:  •  XR knee 3 vw right non injury; Future  •  Ambulatory Referral to Physical Therapy; Future    Left elbow pain    Orders:  •  XR elbow 3+ vw left; Future  •  Cock Up Wrist Splint  •  Ambulatory Referral to Physical Therapy; Future    Chronic pain of left knee    Orders:  •  XR knee 4+ vw left injury; Future  •  Ambulatory Referral to Physical Therapy; Future    Primary osteoarthritis of left knee    Orders:  •  Ambulatory Referral to Physical Therapy; Future         Darcy Armstrong is a pleasant 54 y.o. female presenting today for initial evaluation of her left elbow and left knee pain.  Her left knee does demonstrate very mild underlying osteoarthritis on x-ray, but we do not believe that this is her primary pain generator.  Clinically, she is more symptomatic of Pes anserine bursitis.  We discussed these findings with her here today.  We have recommended conservative treatment in the form of Voltaren gel 3-4 times a day on the affected area.  We provided her with a home exercise program as well as a referral to physical therapy to address her knee.  Regarding her left elbow which is more painful, she is symptomatic of lateral epicondylitis.  We discussed treatment options with her and are recommending conservative treatment.  She can also utilize Voltaren gel on the left elbow 3-4 times a day.  We provided her with a home exercise program and included in the referral to physical therapy.  We discussed that even with conservative treatment, may take 12 months to fully recover.  We will plan to see her back in  "approximately 2 months to address her left elbow and left knee.  She expressed understanding all of her questions were addressed today    I have personally reviewed pertinent films in PACS of the updated x-rays taken of her bilateral knees and left elbow. There is no fracture, dislocation, lesion, or significant degenerative changes in left elbow.  Her left knee x-rays demonstrate very mild medial joint space narrowing, but otherwise no fracture, desiccation, lytic or blastic lesion, or significant degenerative changes    Subjective: Initial evaluation left knee and elbow pain    History: Darcy Armstrong is a 54 y.o. female presenting today for initial evaluation of both her left knee and left elbow.  She is right-hand dominant.  She denies any history of injury or surgery to either the left knee or left elbow she reports that 3 to 4 months ago, she hit her elbow against something in the shower, and has had intermittent pain since that time.  She locates the pain on the lateral aspect of her elbow.  It is worse with activity such as lifting.  It has become more troublesome over the past few weeks and is bothering her on a daily basis.  She has tried Salonpas without significant relief.  She denies any paresthesias in the left upper extremity.  Regarding her left knee, she has noted some medial sided discomfort with bending and going up and down stairs.  She was carrying heavy things up and down stairs multiple times a few days ago, and her pain has been worse since then.  She denies any locking, buckling, or giving way.  She denies any paresthesias in the left lower extremity.  She is independent with all activities of daily living and enjoyment    Estimated body mass index is 25.82 kg/m² as calculated from the following:    Height as of this encounter: 5' 6\" (1.676 m).    Weight as of this encounter: 72.6 kg (160 lb).    No results found for: \"HGBA1C\"    Social History     Occupational History   • Not on file "   Tobacco Use   • Smoking status: Never   • Smokeless tobacco: Never   Vaping Use   • Vaping status: Not on file   Substance and Sexual Activity   • Alcohol use: Yes     Comment: socially   • Drug use: Never   • Sexual activity: Yes     Partners: Female       Objective:  Left Knee Exam     Muscle Strength   The patient has normal left knee strength.    Tenderness   The patient is experiencing tenderness in the medial joint line, pes anserinus and MCL.    Range of Motion   Extension:  0 normal   Flexion:  130 normal     Tests   Omar:  Medial - negative Lateral - negative  Varus: negative Valgus: negative  Drawer:  Anterior - negative       Patellar apprehension: negative    Other   Erythema: absent  Scars: absent  Sensation: normal  Pulse: present  Swelling: none  Effusion: no effusion present    Comments:  Patella tracks midline flat without crepitance  Collateral ligament stable at 0, 30, 90  Negative patellofemoral grind  Thigh calf soft nontender  Grossly distally neurovascular intact      Left Elbow Exam     Tenderness   The patient is experiencing tenderness in the lateral epicondyle.     Range of Motion   Extension:  normal   Flexion:  normal   Pronation:  normal   Supination:  normal     Muscle Strength   Pronation:  5/5   Supination:  5/5     Tests   Varus: negative  Valgus: negative  Tinel's sign (cubital tunnel): negative    Other   Erythema: absent  Scars: absent  Sensation: normal  Pulse: present    Comments:  Pain resisted wrist extension in elbow extension  Tenderness lateral epicondyle and common extensor tendon  Full range of motion and strength  Grossly distally neurovascularly intact          Observations   Left Knee   Negative for effusion.       There were no vitals filed for this visit.    Past Medical History[1]    Past Surgical History[2]    Family History[3]    Current Medications[4]    Allergies[5]      Review of Systems   Constitutional: Negative.    HENT: Negative.     Eyes: Negative.     Respiratory: Negative.     Cardiovascular: Negative.    Gastrointestinal: Negative.    Endocrine: Negative.    Genitourinary: Negative.    Musculoskeletal:  Positive for arthralgias, joint swelling and myalgias.   Skin: Negative.    Allergic/Immunologic: Negative.    Neurological: Negative.    Hematological: Negative.    Psychiatric/Behavioral: Negative.           Physical Exam  Vitals and nursing note reviewed.   Constitutional:       Appearance: Normal appearance. She is well-developed.      Comments: Body mass index is 25.82 kg/m².   HENT:      Head: Normocephalic and atraumatic.      Right Ear: External ear normal.      Left Ear: External ear normal.     Eyes:      Extraocular Movements: Extraocular movements intact.      Conjunctiva/sclera: Conjunctivae normal.       Cardiovascular:      Rate and Rhythm: Normal rate.      Pulses: Normal pulses.   Pulmonary:      Effort: Pulmonary effort is normal.   Abdominal:      Palpations: Abdomen is soft.     Musculoskeletal:      Cervical back: Normal range of motion.      Left knee: No effusion.      Instability Tests: Medial Omar test negative and lateral Omar test negative.      Comments: See ortho exam     Skin:     General: Skin is warm and dry.     Neurological:      General: No focal deficit present.      Mental Status: She is alert and oriented to person, place, and time. Mental status is at baseline.     Psychiatric:         Mood and Affect: Mood normal.         Behavior: Behavior normal.         Thought Content: Thought content normal.         Judgment: Judgment normal.         Scribe Attestation    I,:  Jesus Roman PA-C am acting as a scribe while in the presence of the attending physician.:       I,:  Franc Messina MD personally performed the services described in this documentation    as scribed in my presence.:           This document was created using speech voice recognition software.   Grammatical errors, random word  insertions, pronoun errors, and incomplete sentences are an occasional consequence of this system due to software limitations, ambient noise, and hardware issues.   Any formal questions or concerns about content, text, or information contained within the body of this dictation should be directly addressed to the provider for clarification.           [1]  Past Medical History:  Diagnosis Date   • Anemia    [2]  Past Surgical History:  Procedure Laterality Date   • VULVA SURGERY     [3]  Family History  Problem Relation Name Age of Onset   • Hypertension Mother     • Thyroid disease Father     • No Known Problems Maternal Aunt     • No Known Problems Maternal Aunt     • No Known Problems Maternal Aunt     • No Known Problems Maternal Aunt     • Uterine cancer Paternal Aunt     • Breast cancer Paternal Aunt  45   • Depression Paternal Aunt     • No Known Problems Paternal Aunt     • No Known Problems Paternal Aunt     • Breast cancer Cousin  45   [4]    Current Outpatient Medications:   •  cyanocobalamin (VITAMIN B-12) 100 MCG tablet, , Disp: , Rfl:   •  magnesium oxide (MAG-OX) 400 mg, Take 400 mg by mouth 2 (two) times a day, Disp: , Rfl:   •  multivitamin (THERAGRAN) TABS, Take 1 tablet by mouth in the morning., Disp: , Rfl:   •  rosuvastatin (CRESTOR) 10 MG tablet, Take 10 mg by mouth daily, Disp: , Rfl:   •  valACYclovir (VALTREX) 1,000 mg tablet, , Disp: , Rfl:   •  docusate sodium (COLACE) 100 mg capsule, Take 1 capsule (100 mg total) by mouth 2 (two) times a day, Disp: 60 capsule, Rfl: 5  •  ferrous sulfate 325 (65 Fe) mg tablet, CVS Iron 325 (65 Fe) MG Oral Tablet Refills: 0 (Patient not taking: Reported on 2/10/2022), Disp: , Rfl:   •  hydrocortisone 1 % cream, Apply topically 4 (four) times a day as needed (Apply b.i.d.), Disp: 30 g, Rfl: 0  •  ibuprofen (MOTRIN) 600 mg tablet, Take 1 tablet (600 mg total) by mouth every 6 (six) hours as needed for moderate pain, Disp: 30 tablet, Rfl: 0[5]  No Known  Allergies

## 2025-07-23 ENCOUNTER — EVALUATION (OUTPATIENT)
Dept: OCCUPATIONAL THERAPY | Facility: CLINIC | Age: 55
End: 2025-07-23
Payer: COMMERCIAL

## 2025-07-23 ENCOUNTER — TELEPHONE (OUTPATIENT)
Age: 55
End: 2025-07-23

## 2025-07-23 DIAGNOSIS — M77.12 LATERAL EPICONDYLITIS OF LEFT ELBOW: Primary | ICD-10-CM

## 2025-07-23 DIAGNOSIS — M25.522 LEFT ELBOW PAIN: ICD-10-CM

## 2025-07-23 PROCEDURE — 97165 OT EVAL LOW COMPLEX 30 MIN: CPT

## 2025-07-23 NOTE — PROGRESS NOTES
OT Evaluation     Today's date: 2025  Patient name: Darcy Armstrong  : 1970  MRN: [id number]  Referring provider: Jesus Roman*  Dx:   Encounter Diagnosis     ICD-10-CM    1. Lateral epicondylitis of left elbow  M77.12 Ambulatory Referral to Physical Therapy     OT Plan of Care Cert/Re-cert      2. Left elbow pain  M25.522 Ambulatory Referral to Physical Therapy     OT Plan of Care Cert/Re-cert          Start Time: 1000  Stop Time: 1045  Total time in clinic (min): 45 minutes    Assessment  Impairments: activity intolerance, impaired physical strength, lacks appropriate home exercise program, pain with function and weight-bearing intolerance  Symptom irritability: moderate  Understanding of Dx/Px/POC: good     Prognosis: good    Goals  Short Term Goals by 2 - 4 weeks:    Establish HEP to increase performance with daily activities.     Patient will increase L  strength by at least 5 lbs to assist in completing ADLs.     Patient will decrease pain rate of UE by at least 2 points per the VAS scale.       Long Term Goals by discharge:    Establish final home exercise program to enhance maximal functional level with ADLs.                            Achieve functional strength of LUE for full return to high level ADLs.             Plan  Planned modality interventions: manual electrical stimulation, electrical stimulation/Russian stimulation, TENS, thermotherapy: hydrocollator packs and high voltage pulsed current: pain management    Planned therapy interventions: IASTM, kinesiology taping, joint mobilization, manual therapy, activity modification, balance/weight bearing training, body mechanics training, neuromuscular re-education, nerve gliding, orthotic fitting/training, orthotic management and training, patient/caregiver education, fine motor coordination training, flexibility, functional ROM exercises, graded activity, graded exercise, graded motor, IADL retraining, home exercise program,  therapeutic exercise, therapeutic training, therapeutic activities, stretching and strengthening    Frequency: 1-2x week  Duration in weeks: 8  Plan of Care beginning date: 2025  Plan of Care expiration date: 2025  Treatment plan discussed with: patient        Subjective Evaluation    History of Present Illness  Mechanism of injury: Patient is a 54 y.o. female who presents for OT IE and treatment for L lateral epicondylitis. Patient reports she saw physician on 2025 for elbow and knee pain. She denies any history of injury or surgery to both her L elbow but she reports that 3 months ago, she hit her elbow against something in the shower, and has had intermittent pain since that time. She locates the pain on the lateral aspect of her elbow and she keeps bumping it on the corner of chairs and tables. It is worse with activity such as lifting. Patient presents to therapy with counter force brace on which she states has been helping. Patient referred by Dr. Messina to initiate treatment including hand therapy.      Quality of life: good    Patient Goals  Patient goals for therapy: increased motion, decreased pain, independence with ADLs/IADLs, increased strength and return to sport/leisure activities    Pain  Current pain ratin  At worst pain ratin  Relieving factors: medications (tylenol/aleeve)  Aggravating factors: lifting (picking up grandchild ~25 lbs, weight lifting)    Social Support    Employment status: not working  Hand dominance: right          Objective     Tenderness     Left Elbow   Tenderness in the lateral epicondyle.     Left Wrist/Hand   Tenderness in the lateral epicondyle.     Active Range of Motion     Left Wrist   Normal active range of motion    Right Wrist   Normal active range of motion    Strength/Myotome Testing     Left Wrist/Hand      (2nd hand position)     Trial 1: 35    Comments: Pronated  outstretched arm: 25 lbs    Right Wrist/Hand      (2nd hand  position)     Trial 1: 65    Comments: Pronated  outstretched arm: 60 lbs    Tests     Left Elbow   Positive Cozen's.     Right Elbow   Negative Cozen's.     Left Wrist/Hand   Negative resisted middle finger.     Right Wrist/Hand   Negative resisted middle finger.              Precautions: Universal      Visit Tracker - NO AUTH REQ BOMN  Date IE  7/23                                                                                    PMHx:   has a past medical history of Anemia.       Manuals HEP 7/23/2025                       Ther Ex     Education on HEP and dx x x10min   STM at lateral epicondyle x X5 min   Wrist extensor stretch, palm down, fist and palm down, then fist palm down with ulnar deviation  x X5 min                                  Ther Act                     Modalities     MHP  x5 min    Kinesiotape at L lateral epi  X2 min        Assessment:   Patient tolerated session well. Patient demonstrates discomfort and tenderness with palpation at the L lateral epicondyle, decreased L  strength compared to contralateral side upon assessment today. Patient session focused on patient education on anatomy and physiology concerning current dx, techniques for decreasing deficits through HEP, and appropriate use of modalities. Patient educated on HEP to include wrist extensor stretches as well as overall education regarding proper positioning and activity modification to allow for decreased inflammation at the L lateral epicondyle with verbal instructions and handouts for patient reference. Kinesio tape was placed at the lateral epicondyle of the LUE for support, positioning and protection to facilitate improvement in symptoms. Reviewed with patient about potential adverse effects of the Kinesiotape. Patient educated on treatment plan at this time. Patient benefiting from skilled hand therapy OT to reduce deficits to improve independence with daily activities.        Plan:   Focus on HEP, A/AA/PROM, TE,  TA, activity modifications, stretching, strengthening, manual therapy, modalities PRN to improve ability to complete daily activites with ease.  POC 7/23/2025 - 9/17/2025

## 2025-08-04 ENCOUNTER — EVALUATION (OUTPATIENT)
Dept: PHYSICAL THERAPY | Facility: CLINIC | Age: 55
End: 2025-08-04

## 2025-08-04 DIAGNOSIS — M25.562 CHRONIC PAIN OF LEFT KNEE: ICD-10-CM

## 2025-08-04 DIAGNOSIS — G89.29 CHRONIC PAIN OF LEFT KNEE: ICD-10-CM

## 2025-08-04 DIAGNOSIS — M17.12 PRIMARY OSTEOARTHRITIS OF LEFT KNEE: ICD-10-CM

## 2025-08-04 DIAGNOSIS — M70.52 PES ANSERINUS BURSITIS OF LEFT KNEE: Primary | ICD-10-CM

## 2025-08-04 PROCEDURE — 97161 PT EVAL LOW COMPLEX 20 MIN: CPT

## 2025-08-07 ENCOUNTER — OFFICE VISIT (OUTPATIENT)
Dept: OCCUPATIONAL THERAPY | Facility: CLINIC | Age: 55
End: 2025-08-07
Attending: PHYSICIAN ASSISTANT

## 2025-08-07 DIAGNOSIS — M25.522 LEFT ELBOW PAIN: ICD-10-CM

## 2025-08-07 DIAGNOSIS — M77.12 LATERAL EPICONDYLITIS OF LEFT ELBOW: Primary | ICD-10-CM

## 2025-08-07 PROCEDURE — 97110 THERAPEUTIC EXERCISES: CPT

## 2025-08-14 ENCOUNTER — OFFICE VISIT (OUTPATIENT)
Dept: PHYSICAL THERAPY | Facility: CLINIC | Age: 55
End: 2025-08-14

## 2025-08-21 ENCOUNTER — OFFICE VISIT (OUTPATIENT)
Dept: OCCUPATIONAL THERAPY | Facility: CLINIC | Age: 55
End: 2025-08-21
Attending: PHYSICIAN ASSISTANT

## 2025-08-21 DIAGNOSIS — M25.522 LEFT ELBOW PAIN: ICD-10-CM

## 2025-08-21 DIAGNOSIS — M77.12 LATERAL EPICONDYLITIS OF LEFT ELBOW: Primary | ICD-10-CM

## 2025-08-21 PROCEDURE — 97110 THERAPEUTIC EXERCISES: CPT
